# Patient Record
Sex: MALE | Race: WHITE | NOT HISPANIC OR LATINO | Employment: FULL TIME | URBAN - METROPOLITAN AREA
[De-identification: names, ages, dates, MRNs, and addresses within clinical notes are randomized per-mention and may not be internally consistent; named-entity substitution may affect disease eponyms.]

---

## 2019-05-27 ENCOUNTER — HOSPITAL ENCOUNTER (EMERGENCY)
Facility: MEDICAL CENTER | Age: 44
End: 2019-05-27
Attending: EMERGENCY MEDICINE
Payer: COMMERCIAL

## 2019-05-27 ENCOUNTER — APPOINTMENT (OUTPATIENT)
Dept: RADIOLOGY | Facility: MEDICAL CENTER | Age: 44
End: 2019-05-27
Attending: EMERGENCY MEDICINE
Payer: COMMERCIAL

## 2019-05-27 VITALS
DIASTOLIC BLOOD PRESSURE: 87 MMHG | SYSTOLIC BLOOD PRESSURE: 130 MMHG | BODY MASS INDEX: 30.62 KG/M2 | RESPIRATION RATE: 18 BRPM | HEART RATE: 89 BPM | TEMPERATURE: 98 F | HEIGHT: 73 IN | OXYGEN SATURATION: 98 % | WEIGHT: 231.04 LBS

## 2019-05-27 DIAGNOSIS — S61.309A AVULSION OF FINGERNAIL, INITIAL ENCOUNTER: ICD-10-CM

## 2019-05-27 DIAGNOSIS — S61.310A LACERATION OF RIGHT INDEX FINGER WITHOUT FOREIGN BODY WITH DAMAGE TO NAIL, INITIAL ENCOUNTER: ICD-10-CM

## 2019-05-27 PROCEDURE — A9270 NON-COVERED ITEM OR SERVICE: HCPCS | Performed by: EMERGENCY MEDICINE

## 2019-05-27 PROCEDURE — 303747 HCHG EXTRA SUTURE

## 2019-05-27 PROCEDURE — 304999 HCHG REPAIR-SIMPLE/INTERMED LEVEL 1

## 2019-05-27 PROCEDURE — 304217 HCHG IRRIGATION SYSTEM

## 2019-05-27 PROCEDURE — 700102 HCHG RX REV CODE 250 W/ 637 OVERRIDE(OP): Performed by: EMERGENCY MEDICINE

## 2019-05-27 PROCEDURE — 73140 X-RAY EXAM OF FINGER(S): CPT | Mod: RT

## 2019-05-27 PROCEDURE — 90715 TDAP VACCINE 7 YRS/> IM: CPT | Performed by: EMERGENCY MEDICINE

## 2019-05-27 PROCEDURE — 700111 HCHG RX REV CODE 636 W/ 250 OVERRIDE (IP): Performed by: EMERGENCY MEDICINE

## 2019-05-27 PROCEDURE — 90471 IMMUNIZATION ADMIN: CPT

## 2019-05-27 PROCEDURE — 303485 HCHG DRESSING MEDIUM

## 2019-05-27 PROCEDURE — 99284 EMERGENCY DEPT VISIT MOD MDM: CPT

## 2019-05-27 RX ORDER — CETIRIZINE HYDROCHLORIDE 10 MG/1
10 TABLET ORAL
Qty: 30 TAB | Refills: 0 | Status: SHIPPED | OUTPATIENT
Start: 2019-05-27

## 2019-05-27 RX ORDER — CEPHALEXIN 250 MG/1
500 CAPSULE ORAL ONCE
Status: COMPLETED | OUTPATIENT
Start: 2019-05-27 | End: 2019-05-27

## 2019-05-27 RX ORDER — CEPHALEXIN 500 MG/1
500 CAPSULE ORAL 4 TIMES DAILY
Qty: 12 CAP | Refills: 0 | Status: SHIPPED | OUTPATIENT
Start: 2019-05-27 | End: 2019-05-30

## 2019-05-27 RX ORDER — PREDNISONE 20 MG/1
40 TABLET ORAL DAILY
Qty: 6 TAB | Refills: 0 | Status: SHIPPED | OUTPATIENT
Start: 2019-05-27 | End: 2019-05-30

## 2019-05-27 RX ADMIN — CEPHALEXIN 500 MG: 250 CAPSULE ORAL at 18:26

## 2019-05-27 RX ADMIN — CLOSTRIDIUM TETANI TOXOID ANTIGEN (FORMALDEHYDE INACTIVATED), CORYNEBACTERIUM DIPHTHERIAE TOXOID ANTIGEN (FORMALDEHYDE INACTIVATED), BORDETELLA PERTUSSIS TOXOID ANTIGEN (GLUTARALDEHYDE INACTIVATED), BORDETELLA PERTUSSIS FILAMENTOUS HEMAGGLUTININ ANTIGEN (FORMALDEHYDE INACTIVATED), BORDETELLA PERTUSSIS PERTACTIN ANTIGEN, AND BORDETELLA PERTUSSIS FIMBRIAE 2/3 ANTIGEN 0.5 ML: 5; 2; 2.5; 5; 3; 5 INJECTION, SUSPENSION INTRAMUSCULAR at 17:18

## 2019-05-27 NOTE — ED PROVIDER NOTES
"ED Provider Note        History obtained from: Patient    CHIEF COMPLAINT  Chief Complaint   Patient presents with   • Digit Pain     R finger laceration vs edger       HPI  Manny Wong is a 43 y.o. male who presents with right second digit laceration.  Patient was cleaning his lawn edger, and thought it was unplugged, however it then moved and injured his finger.  He is unsure of his last tetanus.  He denies any other injuries.    REVIEW OF SYSTEMS    CONSTITUTIONAL:  No fever.  CARDIOVASCULAR:  No chest discomfort.  RESPIRATORY:  No pleuritic chest pain.  GASTROINTESTINAL:  No abdominal pain.    See HPI for further details.       PAST MEDICAL HISTORY  History reviewed. No pertinent past medical history.    FAMILY HISTORY  History reviewed. No pertinent family history.    SOCIAL HISTORY   reports that he has never smoked. He does not have any smokeless tobacco history on file. He reports that he drinks alcohol. He reports that he does not use drugs.    SURGICAL HISTORY  Past Surgical History:   Procedure Laterality Date   • ELBOW ARTHROSCOPY  5/17/2013    Performed by Maxwell Quezada M.D. at SURGERY [facility]   • LATERAL RELEASE  5/17/2013    Performed by Maxwell Quezada M.D. at [facility]   • SHOULDER ARTHROSCOPY  2000    left   • ARTHROSCOPY, KNEE  1994/1997/1999    right x 3       CURRENT MEDICATIONS  Home Medications    **Home medications have not yet been reviewed for this encounter**         ALLERGIES  No Known Allergies    PHYSICAL EXAM  VITAL SIGNS: /96   Pulse 82   Temp 36.7 °C (98 °F) (Temporal)   Resp 16   Ht 1.854 m (6' 1\")   Wt 104.8 kg (231 lb 0.7 oz)   SpO2 98%   BMI 30.48 kg/m²    Gen: alert, no acute distress  HENT: ATNC  Eyes: normal conjuctiva  Resp: No resipiratory distress.   CV: No JVD   Abd: Non-distended  Extremities: No deformity.  Scattered abrasions over her right hand.  Right distal phalanx has skin avulsion with avulsion of the nail, " laceration involving the nailbed and radial aspect of the finger.  Distal CSM is intact.  Nail matrix damaged          RADIOLOGY/PROCEDURES  DX-FINGER(S) 2+ RIGHT   Final Result      Acute mildly displaced fracture of the tuft of the distal phalanx of the second digit.        Laceration repair  Indication: laceration  The patient was placed in the appropriate position. Local anesthesia achieved with 1% LIDOCAINE WITHOUT epinephrine and the wound was irrigated  until free of any debris or other foreign body. I inspected the wound and could not appreciate any foreign matter.    3 cm SUPERFICIAL laceration closed in 1 layer with 6 sutures of 5 -0 size Ethilon suture material with limited approximation. A petroleum-based dressing was placed over the wound. There were no immediate complications.  Blood loss: 5mL       COURSE & MEDICAL DECISION MAKING  Pertinent Labs & Imaging studies reviewed. (See chart for details)    Patient presents with injury to his right index finger.  There is a complete avulsion of the fingernail with laceration of the nailbed.  There is a significant amount of nail matrix missing from this avulsion injury.  Will obtain x-ray to evaluate for retained foreign body, tuft fracture.  Patient will have his tetanus updated as he believes it is been at least 10 years.  Will attempt to close the patient's wounds primarily, refer to hand surgery for follow-up    Tissue avulsion was approximated with closure of the nailbed laceration as best as possible.  There is still significant tissue missing due to the avulsion, patient was advised for local wound care, referred to hand surgery for follow-up.    FINAL IMPRESSION  1. Avulsion of fingernail, initial encounter    2. Laceration of right index finger without foreign body with damage to nail, initial encounter

## 2019-05-28 NOTE — DISCHARGE INSTRUCTIONS
You were treated today for a laceration. Your physical exam is reassuring. Your xray did not show any signs of foreign body in the wound. Your laceration was repaired with 6 stitches.     Your stitches will need to come out in 10-14 days. Stitches can be removed at your regular doctor, or any urgent care or emergency department.     Keep the wound dry for the first 24-48 hours. After 24-48 hours, you may wash the area with soap and water, however do not soak the wound. Do not swim until your stitches have been removed.     Use petroleum jelly or antibiotic ointment to keep the wound moist and supple. The scar will continue to remodel for 6 months to a year. Exposure to sun will make the scar more noticeable, so use sun protection.     Please come back to the ED if you develop fever, chills, increased redness around the wound, drainage from wound or for any other concerns.

## 2022-07-22 ENCOUNTER — TELEPHONE (OUTPATIENT)
Dept: MEDICAL GROUP | Facility: OTHER | Age: 47
End: 2022-07-22

## 2022-07-22 DIAGNOSIS — R52 PAIN: ICD-10-CM

## 2022-07-22 RX ORDER — PREDNISONE 50 MG/1
50 TABLET ORAL DAILY
Qty: 5 TABLET | Refills: 0 | Status: SHIPPED | OUTPATIENT
Start: 2022-07-22